# Patient Record
Sex: MALE | NOT HISPANIC OR LATINO | Employment: FULL TIME | URBAN - NONMETROPOLITAN AREA
[De-identification: names, ages, dates, MRNs, and addresses within clinical notes are randomized per-mention and may not be internally consistent; named-entity substitution may affect disease eponyms.]

---

## 2022-06-16 ENCOUNTER — OFFICE VISIT (OUTPATIENT)
Dept: FAMILY MEDICINE | Facility: OTHER | Age: 32
End: 2022-06-16
Attending: PHYSICIAN ASSISTANT
Payer: COMMERCIAL

## 2022-06-16 VITALS
RESPIRATION RATE: 16 BRPM | TEMPERATURE: 97.3 F | DIASTOLIC BLOOD PRESSURE: 98 MMHG | SYSTOLIC BLOOD PRESSURE: 150 MMHG | HEART RATE: 133 BPM | WEIGHT: 210.1 LBS | OXYGEN SATURATION: 97 %

## 2022-06-16 DIAGNOSIS — L03.211 CELLULITIS OF FACE: Primary | ICD-10-CM

## 2022-06-16 PROCEDURE — 99203 OFFICE O/P NEW LOW 30 MIN: CPT | Performed by: STUDENT IN AN ORGANIZED HEALTH CARE EDUCATION/TRAINING PROGRAM

## 2022-06-16 RX ORDER — DOXYCYCLINE HYCLATE 100 MG
100 TABLET ORAL 2 TIMES DAILY
Qty: 10 TABLET | Refills: 0 | Status: SHIPPED | OUTPATIENT
Start: 2022-06-16

## 2022-06-16 RX ORDER — DEXTROAMPHETAMINE SACCHARATE, AMPHETAMINE ASPARTATE, DEXTROAMPHETAMINE SULFATE AND AMPHETAMINE SULFATE 7.5; 7.5; 7.5; 7.5 MG/1; MG/1; MG/1; MG/1
30 TABLET ORAL 2 TIMES DAILY
COMMUNITY

## 2022-06-16 ASSESSMENT — PAIN SCALES - GENERAL: PAINLEVEL: EXTREME PAIN (8)

## 2022-06-16 NOTE — LETTER
June 16, 2022      Osiel Weinstein  [id number] [address] [address]        To Whom It May Concern:    Osiel Weinstein was seen in our clinic 6/16/2022 . He may return to work without restrictions.      Sincerely,        Stepan Christine MD

## 2022-06-16 NOTE — PROGRESS NOTES
Mr. Weinstein is a 31 year old male who presents to the clinic for lip concern    HPI   Patient states that he has a lesion on his lower lip.  Started a few days ago.  He has never had a lesion like this in the past.  It has been painful.  No blisters.  No sick contacts.  No erythema of the face  States he has not had a seizure.  No trauma to the face or lip    Patient also requests a work note    Review of Systems     Reviewed and updated as needed this visit by Provider                     EXAM:   Vitals:    06/16/22 1814   BP: (!) 150/98   BP Location: Right arm   Patient Position: Sitting   Cuff Size: Adult Regular   Pulse: (!) 133   Resp: 16   Temp: 97.3  F (36.3  C)   TempSrc: Temporal   SpO2: 97%   Weight: 95.3 kg (210 lb 1.6 oz)         BP Readings from Last 3 Encounters:   06/16/22 (!) 150/98      Wt Readings from Last 3 Encounters:   06/16/22 95.3 kg (210 lb 1.6 oz)      There is no height or weight on file to calculate BMI.     Physical Exam   General: Pleasant 31-year-old man sitting clinic no acute distress  HEENT: Two 1 cm round punctate lesions of the lower lip.  No blisters or open wound.  CV: Regular rate and rhythm heart rate approximately 80 no ectopy or murmur    INVESTIGATIONS:  - Labs reviewed in [facility]     ASSESSMENT AND PLAN:    ICD-10-CM    1. Cellulitis of face  L03.211 doxycycline hyclate (VIBRA-TABS) 100 MG tablet       Assessment/Plan: Patient with 2 sores of the lower lip differential of cellulitis versus herpes labialis versus cold sore versus traumatic excoriation of the lip.  Due to the lesion not resolving with triple antibiotic ointment patient was given a short course of doxycycline.  No fluid or open lesion to swab for herpes but if further lesions recur would attempt a HSV swab.  Patient was also given a work note stating he was in clinic today.        Electronically signed by:  Stepan Christine MD on 6/16/2022  Internal Medicine  [facility]